# Patient Record
Sex: MALE
[De-identification: names, ages, dates, MRNs, and addresses within clinical notes are randomized per-mention and may not be internally consistent; named-entity substitution may affect disease eponyms.]

---

## 2021-01-08 ENCOUNTER — APPOINTMENT (OUTPATIENT)
Dept: COLORECTAL SURGERY | Facility: CLINIC | Age: 21
End: 2021-01-08
Payer: MEDICAID

## 2021-01-08 VITALS
DIASTOLIC BLOOD PRESSURE: 74 MMHG | OXYGEN SATURATION: 99 % | HEART RATE: 51 BPM | TEMPERATURE: 97.2 F | BODY MASS INDEX: 40.18 KG/M2 | WEIGHT: 250 LBS | SYSTOLIC BLOOD PRESSURE: 125 MMHG | HEIGHT: 66 IN

## 2021-01-08 DIAGNOSIS — L98.8 OTHER SPECIFIED DISORDERS OF THE SKIN AND SUBCUTANEOUS TISSUE: ICD-10-CM

## 2021-01-08 PROCEDURE — 46600 DIAGNOSTIC ANOSCOPY SPX: CPT

## 2021-01-08 PROCEDURE — 99072 ADDL SUPL MATRL&STAF TM PHE: CPT

## 2021-01-08 PROCEDURE — 99204 OFFICE O/P NEW MOD 45 MIN: CPT | Mod: 25

## 2021-01-08 NOTE — ASSESSMENT
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  Has been enlarging slowly.  NO acute abscess needing I and D but increasingly symptomatic.\par Family and patient want treatment\par Unroofing and near total excision with marsupilization is advised.\par Risk of recurrence, bleeding, infection discussed. Alternatives discussed.\par All questions answered.  \par Given handouts.\par

## 2021-01-08 NOTE — PHYSICAL EXAM
[None] : no anal fissures seen [Multiple Sinus Tracts] : multiple perianal sinus tracts [Pilonidal Cyst] : a pilonidal cyst [Pilonidal Sinus] : a pilonidal sinus [Nonprolapsing] : a nonprolapsing (grade I) [Normal Breath Sounds] : Normal breath sounds [Normal Heart Sounds] : normal heart sounds [2+] : left 2+ [No Rash or Lesion] : No rash or lesion [Alert] : alert [Oriented to Person] : oriented to person [Oriented to Place] : oriented to place [Oriented to Time] : oriented to time [Calm] : calm [Excoriation] : no perianal excoriation [Fistula] : no fistulas [Wart] : no warts [Ulcer ___ cm] : no ulcers [Pilonidal Sinus Draining] : no pilonidal sinus drainage [Tender, Swollen] : nontender, non-swollen [Thrombosed] : that was not thrombosed [Skin Tags] : there were no residual hemorrhoidal skin tags seen [Stool Sample Taken] : no stool obtained on rectal exam [Gross Blood] : no gross blood [JVD] : no jugular venous distention  [Thyroid] : the thyroid was abnormal [Carotid Bruits] : no carotid bruits [de-identified] : benign, no masses, not distended, mild to moderate obesity. [de-identified] : post sacral area shows midline crypts/openings x 5-6 in pilonidal area.  some hair protruding from the cephaladmost openings.  No acute absces [de-identified] : rectal digital: tone WNL, no masses or induration or fistula tract noted [de-identified] : anoscopy: grade 0-1 hemorrhoids, no fissure, no internal openings [de-identified] : NAD

## 2021-01-08 NOTE — HISTORY OF PRESENT ILLNESS
[FreeTextEntry1] : 20 year old  male  with history of pilonidal for 3-4  years.  Has been discharging. , was treated with antibiotics initially about  3 years .    Was stable for a while but recently has begun to become more of a problem. Has never been formally drained. \par \par bowel movements are  daily, no issues, no bleeding, no history of colitis.\par No hx anal abscess. \par \par Meds: none \par NKDA\par \par PSH: myringotomy tube\par no abdominal or anorectal surgery or other surgery noted

## 2021-01-28 ENCOUNTER — APPOINTMENT (OUTPATIENT)
Dept: COLORECTAL SURGERY | Facility: HOSPITAL | Age: 21
End: 2021-01-28